# Patient Record
Sex: MALE | Race: WHITE
[De-identification: names, ages, dates, MRNs, and addresses within clinical notes are randomized per-mention and may not be internally consistent; named-entity substitution may affect disease eponyms.]

---

## 2018-01-29 ENCOUNTER — HOSPITAL ENCOUNTER (EMERGENCY)
Dept: HOSPITAL 80 - FED | Age: 33
Discharge: HOME | End: 2018-01-29
Payer: COMMERCIAL

## 2018-01-29 VITALS
SYSTOLIC BLOOD PRESSURE: 125 MMHG | HEART RATE: 99 BPM | OXYGEN SATURATION: 98 % | DIASTOLIC BLOOD PRESSURE: 74 MMHG | RESPIRATION RATE: 16 BRPM | TEMPERATURE: 97.7 F

## 2018-01-29 DIAGNOSIS — F17.200: ICD-10-CM
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
## 2018-01-29 DIAGNOSIS — F10.129: ICD-10-CM

## 2018-01-29 DIAGNOSIS — S01.81XA: Primary | ICD-10-CM

## 2018-01-29 PROCEDURE — 0HQ1XZZ REPAIR FACE SKIN, EXTERNAL APPROACH: ICD-10-PCS | Performed by: EMERGENCY MEDICINE

## 2018-01-29 NOTE — EDPHY
H & P


Stated Complaint: ETOH, left eye lac


Time Seen by Provider: 01/29/18 05:17


HPI/ROS: 





Chief Complaint:  Alcohol intoxication, fall





HPI:  32-year-old male was out drinking with friends this morning.  Patient had 

a mechanical fell all an hit the left side of his face.  Patient is amnestic to 

events in is not really members happening.  States the last thing he remembers 

is drinking 3-4 alcoholic drinks last night.  Is complaining of some facial 

pain and headache. No neck pain. No numbness or tingling.  Some nausea, no 

vomiting. Denies any other injuries.





ROS:  10 point Review of Systems is negative except as noted in the HPI.





PMH:  Denies





Social History: No smoking, occasional alcohol,  no recreational drug use





Family History: non-contributory





Physical Exam:


Gen: Awake, Alert, smells strongly of alcohol, Airway intact


HEENT:


     Head:  There is a 1 cm jagged laceration above his left eyebrow, no deep 

tissue or bony involvement, no step-offs


     Eyes: PERRLA, EOMI


     Nose: No epistaxis


     Mouth: Normal dentition, Airway patent


     Face: No deformity


Neck: non-tender, no stepoff, Full ROM without pain


Chest:  non-tender, lungs CTA


Heart: normal heart tones


Abd: soft, non-tender, atraumatic


Pelvis: non-tender, stable to AP and Lateral compression


Back: atraumatic, no midline tenderness


Ext: atramatic, full ROM


Skin: no rash


Neuro: CN II-XII intact, Strength 5/5 in all extremities, sensation intact in 

all extremities

















- Personal History


Current Tetanus/Diphtheria Vaccine: Yes





- Medical/Surgical History


Hx Asthma: No


Hx Chronic Respiratory Disease: No


Hx Diabetes: No


Hx Cardiac Disease: No


Hx Renal Disease: No


Hx Cirrhosis: No


Hx Alcoholism: No


Hx HIV/AIDS: No


Hx Splenectomy or Spleen Trauma: No


Other PMH: denies





- Social History


Smoking Status: Current every day smoker


Constitutional: 





 Initial Vital Signs











Temperature (C)  36.6 C   01/29/18 05:09


 


Heart Rate  103 H  01/29/18 05:09


 


Respiratory Rate  18   01/29/18 05:09


 


Blood Pressure  126/102 H  01/29/18 05:09


 


O2 Sat (%)  95   01/29/18 05:09








 











O2 Delivery Mode               Room Air














Allergies/Adverse Reactions: 


 





No Known Allergies Allergy (Unverified 01/29/18 05:12)


 








Home Medications: 














 Medication  Instructions  Recorded


 


NK [No Known Home Meds]  01/29/18














Medical Decision Making





- Diagnostics


Imaging Results: 





CT scan of the head is negative for acute traumatic injury per Dr. Duke.


Imaging: Discussed imaging studies w/ On call Radiologist


Procedures: 





Procedure:  Laceration repair.





Verbal consent was obtained from the patient.  The 1 cm laceration on the left 

forehead was anesthetized in the usual fashion.  The wound was irrigated, 

draped and explored to its base with a gloved finger.  There were no deep 

structures involved.  No tendon injury was identified.  The wound was repaired 

with 3, 6-0 Ethilon simple interrupted sutures.  The wound repair was 

uncomplicated facial laceration repair.  The procedure was performed by myself.


ED Course/Re-evaluation: 





CT scan of the head done because patient is intoxicated has facial trauma and 

has no recollection of events.





CT scan of brain is negative. Lacerations been repaired.  Patient is otherwise 

appropriate.  He is medically cleared for the [facility].





Departure





- Departure


Disposition: Home, Routine, Self-Care


Clinical Impression: 


 Laceration, Alcohol intoxication





Condition: Good


Instructions:  Facial Laceration (ED), Alcohol Intoxication (ED)


Additional Instructions: 


Sutures need to be removed in 5 days, you may return to the emergency 

department or follow up with her primary care physician to have this done.


Return emergency department for increasing headache, uncontrolled nausea 

vomiting, neck pain, numbness, tingling, weakness, or any other concerns.


MEDICALLY CLEARED FOR THE [facility]


Referrals: 


Lorenzo Farah MD [Primary Care Provider] - As per Instructions

## 2018-02-08 ENCOUNTER — HOSPITAL ENCOUNTER (OUTPATIENT)
Dept: HOSPITAL 80 - BMCIMAGING | Age: 33
End: 2018-02-08
Attending: INTERNAL MEDICINE
Payer: COMMERCIAL

## 2018-02-08 DIAGNOSIS — Z13.828: Primary | ICD-10-CM

## 2020-02-08 ENCOUNTER — WALK IN (OUTPATIENT)
Dept: URGENT CARE | Age: 35
End: 2020-02-08

## 2020-02-08 ENCOUNTER — TELEPHONE (OUTPATIENT)
Dept: SCHEDULING | Age: 35
End: 2020-02-08

## 2020-02-08 VITALS
TEMPERATURE: 98.5 F | SYSTOLIC BLOOD PRESSURE: 130 MMHG | HEART RATE: 87 BPM | OXYGEN SATURATION: 97 % | RESPIRATION RATE: 18 BRPM | DIASTOLIC BLOOD PRESSURE: 92 MMHG

## 2020-02-08 DIAGNOSIS — J01.40 ACUTE NON-RECURRENT PANSINUSITIS: Primary | ICD-10-CM

## 2020-02-08 PROCEDURE — 99203 OFFICE O/P NEW LOW 30 MIN: CPT | Performed by: NURSE PRACTITIONER

## 2020-02-08 RX ORDER — BUPROPION HYDROCHLORIDE 300 MG/1
300 TABLET ORAL DAILY
COMMUNITY

## 2020-02-08 RX ORDER — AMOXICILLIN AND CLAVULANATE POTASSIUM 875; 125 MG/1; MG/1
1 TABLET, FILM COATED ORAL 2 TIMES DAILY
Qty: 14 TABLET | Refills: 0 | Status: SHIPPED | OUTPATIENT
Start: 2020-02-08 | End: 2020-02-15

## 2020-02-08 ASSESSMENT — ENCOUNTER SYMPTOMS
FATIGUE: 1
EYES NEGATIVE: 1
SINUS PAIN: 0
SINUS PRESSURE: 1
ALLERGIC/IMMUNOLOGIC NEGATIVE: 1
SORE THROAT: 1
COUGH: 1
RHINORRHEA: 1
ACTIVITY CHANGE: 1
NEUROLOGICAL NEGATIVE: 1
PSYCHIATRIC NEGATIVE: 1